# Patient Record
(demographics unavailable — no encounter records)

---

## 2025-07-14 NOTE — REASON FOR VISIT
[FreeTextEntry1] : 41-year-old man with no significant past medical history comes in for follow-up after hospital visit for syncope.

## 2025-07-14 NOTE — DISCUSSION/SUMMARY
[EKG obtained to assist in diagnosis and management of assessed problem(s)] : EKG obtained to assist in diagnosis and management of assessed problem(s) [FreeTextEntry1] : 1.  Traumatic syncope-in the hospital echocardiogram was normal.  Patient does not report any previous episodes of syncope.  Given traumatic nature will get 30-day MCOT.  2. Family history is positive for CAD in the father who had a bypass at the age of 65.  Will obtain lipid profile for risk evaluation.

## 2025-07-14 NOTE — HISTORY OF PRESENT ILLNESS
[FreeTextEntry1] : 41-year-old man with no significant past medical history comes in for follow-up after hospital visit for syncope.  40 y/o male with no significant medical history presents to the ED c/o an episode of syncope on 7/9/2025. Pt notes he was going to the bathroom attempting to urinate when he passed out. Noted to wake up on the floor, he is unsure how long he was unconscious for. Pt endorses hitting his head and pain to the back of his head. Pt has never had similar episode in the past, however, does endorse brief episodes of lightheadedness. Denies fever/chills, chest pain, palpitations, back pain, headache, dizziness, SOB, CANCINO, diaphoresis, abdominal pain, N/V, decreased PO intake, leg pain, leg swelling, recent sick contacts, recent travel, or blood thinner use.  No personal cardiac hx. Pt has never seen a Cardiologist or had a cardiac work up. Father with CAD.  Trop neg x 2.

## 2025-07-14 NOTE — ASSESSMENT
[FreeTextEntry1] : 41-year-old man with no significant past medical history comes in for follow-up after hospital visit for traumatic syncope.

## 2025-07-14 NOTE — CARDIOLOGY SUMMARY
[de-identified] : 07/10/2025  1. Left ventricular cavity is normal in size. Left ventricular systolic function is normal with an ejection fraction of 65 % by Morley's method of disks with an ejection fraction visually estimated at 60 to 65 %.  2. Normal right ventricular cavity size, with normal wall thickness, and normal right ventricular systolic function.  3. Normal left and right atrial size.  4. No significant valvular disease.  5. No pericardial effusion seen.  6. No prior echocardiogram is available for comparison.